# Patient Record
Sex: MALE | Race: WHITE | NOT HISPANIC OR LATINO | URBAN - METROPOLITAN AREA
[De-identification: names, ages, dates, MRNs, and addresses within clinical notes are randomized per-mention and may not be internally consistent; named-entity substitution may affect disease eponyms.]

---

## 2024-01-12 ENCOUNTER — HOSPITAL ENCOUNTER (EMERGENCY)
Facility: HOSPITAL | Age: 29
Discharge: HOME | End: 2024-01-12
Payer: MEDICAID

## 2024-01-12 ENCOUNTER — APPOINTMENT (OUTPATIENT)
Dept: RADIOLOGY | Facility: HOSPITAL | Age: 29
End: 2024-01-12
Payer: MEDICAID

## 2024-01-12 ENCOUNTER — APPOINTMENT (OUTPATIENT)
Dept: CARDIOLOGY | Facility: HOSPITAL | Age: 29
End: 2024-01-12
Payer: MEDICAID

## 2024-01-12 VITALS
DIASTOLIC BLOOD PRESSURE: 102 MMHG | TEMPERATURE: 98.4 F | HEIGHT: 71 IN | SYSTOLIC BLOOD PRESSURE: 146 MMHG | WEIGHT: 175 LBS | HEART RATE: 90 BPM | OXYGEN SATURATION: 95 % | BODY MASS INDEX: 24.5 KG/M2 | RESPIRATION RATE: 18 BRPM

## 2024-01-12 DIAGNOSIS — R11.2 NAUSEA AND VOMITING, UNSPECIFIED VOMITING TYPE: Primary | ICD-10-CM

## 2024-01-12 LAB
ALBUMIN SERPL BCP-MCNC: 4.5 G/DL (ref 3.4–5)
ALP SERPL-CCNC: 143 U/L (ref 33–120)
ALT SERPL W P-5'-P-CCNC: 22 U/L (ref 10–52)
ANION GAP SERPL CALC-SCNC: 15 MMOL/L (ref 10–20)
APTT PPP: 35 SECONDS (ref 27–38)
AST SERPL W P-5'-P-CCNC: 22 U/L (ref 9–39)
BASOPHILS # BLD AUTO: 0.03 X10*3/UL (ref 0–0.1)
BASOPHILS NFR BLD AUTO: 0.3 %
BILIRUB SERPL-MCNC: 0.4 MG/DL (ref 0–1.2)
BUN SERPL-MCNC: 10 MG/DL (ref 6–23)
CALCIUM SERPL-MCNC: 9.2 MG/DL (ref 8.6–10.3)
CHLORIDE SERPL-SCNC: 105 MMOL/L (ref 98–107)
CO2 SERPL-SCNC: 23 MMOL/L (ref 21–32)
CREAT SERPL-MCNC: 0.73 MG/DL (ref 0.5–1.3)
EGFRCR SERPLBLD CKD-EPI 2021: >90 ML/MIN/1.73M*2
EOSINOPHIL # BLD AUTO: 0.04 X10*3/UL (ref 0–0.7)
EOSINOPHIL NFR BLD AUTO: 0.4 %
ERYTHROCYTE [DISTWIDTH] IN BLOOD BY AUTOMATED COUNT: 13.2 % (ref 11.5–14.5)
GLUCOSE SERPL-MCNC: 106 MG/DL (ref 74–99)
HCT VFR BLD AUTO: 46.7 % (ref 41–52)
HGB BLD-MCNC: 16.4 G/DL (ref 13.5–17.5)
IMM GRANULOCYTES # BLD AUTO: 0.04 X10*3/UL (ref 0–0.7)
IMM GRANULOCYTES NFR BLD AUTO: 0.4 % (ref 0–0.9)
INR PPP: 1.1 (ref 0.9–1.1)
LACTATE SERPL-SCNC: 0.8 MMOL/L (ref 0.4–2)
LIPASE SERPL-CCNC: 42 U/L (ref 9–82)
LYMPHOCYTES # BLD AUTO: 1.02 X10*3/UL (ref 1.2–4.8)
LYMPHOCYTES NFR BLD AUTO: 9.4 %
MCH RBC QN AUTO: 30.6 PG (ref 26–34)
MCHC RBC AUTO-ENTMCNC: 35.1 G/DL (ref 32–36)
MCV RBC AUTO: 87 FL (ref 80–100)
MONOCYTES # BLD AUTO: 1.01 X10*3/UL (ref 0.1–1)
MONOCYTES NFR BLD AUTO: 9.3 %
NEUTROPHILS # BLD AUTO: 8.74 X10*3/UL (ref 1.2–7.7)
NEUTROPHILS NFR BLD AUTO: 80.2 %
NRBC BLD-RTO: 0 /100 WBCS (ref 0–0)
PLATELET # BLD AUTO: 327 X10*3/UL (ref 150–450)
POTASSIUM SERPL-SCNC: 3.9 MMOL/L (ref 3.5–5.3)
PROT SERPL-MCNC: 7.4 G/DL (ref 6.4–8.2)
PROTHROMBIN TIME: 12.1 SECONDS (ref 9.8–12.8)
RBC # BLD AUTO: 5.36 X10*6/UL (ref 4.5–5.9)
S PYO DNA THROAT QL NAA+PROBE: NOT DETECTED
SODIUM SERPL-SCNC: 139 MMOL/L (ref 136–145)
WBC # BLD AUTO: 10.9 X10*3/UL (ref 4.4–11.3)

## 2024-01-12 PROCEDURE — 87651 STREP A DNA AMP PROBE: CPT | Performed by: PHYSICIAN ASSISTANT

## 2024-01-12 PROCEDURE — 96374 THER/PROPH/DIAG INJ IV PUSH: CPT

## 2024-01-12 PROCEDURE — 36415 COLL VENOUS BLD VENIPUNCTURE: CPT | Performed by: PHYSICIAN ASSISTANT

## 2024-01-12 PROCEDURE — 99284 EMERGENCY DEPT VISIT MOD MDM: CPT

## 2024-01-12 PROCEDURE — 83605 ASSAY OF LACTIC ACID: CPT | Performed by: PHYSICIAN ASSISTANT

## 2024-01-12 PROCEDURE — 2500000004 HC RX 250 GENERAL PHARMACY W/ HCPCS (ALT 636 FOR OP/ED): Performed by: PHYSICIAN ASSISTANT

## 2024-01-12 PROCEDURE — 85025 COMPLETE CBC W/AUTO DIFF WBC: CPT | Performed by: PHYSICIAN ASSISTANT

## 2024-01-12 PROCEDURE — 2500000001 HC RX 250 WO HCPCS SELF ADMINISTERED DRUGS (ALT 637 FOR MEDICARE OP): Performed by: PHYSICIAN ASSISTANT

## 2024-01-12 PROCEDURE — 93005 ELECTROCARDIOGRAM TRACING: CPT

## 2024-01-12 PROCEDURE — 71046 X-RAY EXAM CHEST 2 VIEWS: CPT | Performed by: RADIOLOGY

## 2024-01-12 PROCEDURE — C9113 INJ PANTOPRAZOLE SODIUM, VIA: HCPCS | Performed by: PHYSICIAN ASSISTANT

## 2024-01-12 PROCEDURE — 71046 X-RAY EXAM CHEST 2 VIEWS: CPT

## 2024-01-12 PROCEDURE — 85610 PROTHROMBIN TIME: CPT | Performed by: PHYSICIAN ASSISTANT

## 2024-01-12 PROCEDURE — 84520 ASSAY OF UREA NITROGEN: CPT | Performed by: PHYSICIAN ASSISTANT

## 2024-01-12 PROCEDURE — 83690 ASSAY OF LIPASE: CPT | Performed by: PHYSICIAN ASSISTANT

## 2024-01-12 PROCEDURE — 85730 THROMBOPLASTIN TIME PARTIAL: CPT | Performed by: PHYSICIAN ASSISTANT

## 2024-01-12 RX ORDER — PANTOPRAZOLE SODIUM 40 MG/10ML
40 INJECTION, POWDER, LYOPHILIZED, FOR SOLUTION INTRAVENOUS ONCE
Status: COMPLETED | OUTPATIENT
Start: 2024-01-12 | End: 2024-01-12

## 2024-01-12 RX ORDER — FAMOTIDINE 40 MG/1
40 TABLET, FILM COATED ORAL NIGHTLY PRN
Qty: 20 TABLET | Refills: 0 | Status: SHIPPED | OUTPATIENT
Start: 2024-01-12 | End: 2024-02-01

## 2024-01-12 RX ORDER — PHENOL/SODIUM PHENOLATE
20 AEROSOL, SPRAY (ML) MUCOUS MEMBRANE DAILY
Qty: 30 TABLET | Refills: 0 | Status: SHIPPED | OUTPATIENT
Start: 2024-01-12

## 2024-01-12 RX ORDER — SUCRALFATE 1 G/10ML
1 SUSPENSION ORAL ONCE
Status: DISCONTINUED | OUTPATIENT
Start: 2024-01-12 | End: 2024-01-13 | Stop reason: HOSPADM

## 2024-01-12 RX ORDER — FAMOTIDINE 20 MG/1
20 TABLET, FILM COATED ORAL ONCE
Status: COMPLETED | OUTPATIENT
Start: 2024-01-12 | End: 2024-01-12

## 2024-01-12 RX ORDER — SUCRALFATE 1 G/10ML
1 SUSPENSION ORAL 4 TIMES DAILY
Qty: 1200 ML | Refills: 0 | Status: SHIPPED | OUTPATIENT
Start: 2024-01-12 | End: 2024-02-11

## 2024-01-12 RX ADMIN — PANTOPRAZOLE SODIUM 40 MG: 40 INJECTION, POWDER, FOR SOLUTION INTRAVENOUS at 22:37

## 2024-01-12 RX ADMIN — FAMOTIDINE 20 MG: 20 TABLET, FILM COATED ORAL at 22:37

## 2024-01-12 ASSESSMENT — PAIN - FUNCTIONAL ASSESSMENT: PAIN_FUNCTIONAL_ASSESSMENT: 0-10

## 2024-01-12 ASSESSMENT — PAIN SCALES - GENERAL
PAINLEVEL_OUTOF10: 7
PAINLEVEL_OUTOF10: 3

## 2024-01-12 ASSESSMENT — PAIN DESCRIPTION - PAIN TYPE: TYPE: ACUTE PAIN

## 2024-01-12 ASSESSMENT — PAIN DESCRIPTION - DESCRIPTORS: DESCRIPTORS: BURNING

## 2024-01-12 ASSESSMENT — PAIN DESCRIPTION - LOCATION: LOCATION: ABDOMEN

## 2024-01-13 NOTE — ED TRIAGE NOTES
Patient arrives reporting nausea with blood streaked emesis and bloody stool with lower abdominal pain. No anticoagulants. No hx of ulcerative colitis/crohns. He additionally endorses chest pain. Recently tested positive for strep and COVID last week. Not presently prescribed antibiotics, but has taken leftover Zithromax script.

## 2024-01-13 NOTE — ED TRIAGE NOTES
"The patient was seen and examined in triage.    History of Present Illness: The patient is a 28-year-old male who presents emergency department with his girlfriend and mother for assessment of multiple complaints.  He reports for several weeks he has been vomiting blood in which she describes as \"bright red \"however his mom reports it is coffee-ground.  He reports that he is having epigastric abdominal discomfort as well.  He has been having diarrhea which is also bright red in color.  He reports that he cannot keep anything down.  He reports that today he was having a lot of burning chest pains.  Last week he had COVID and for the last 3 days has been complaining of sore throat.  He reports that his \"girl works here and hooked him up and gave him a strep test that was positive \".  He mother started him on leftover azithromycin.  He reports that he \"used to drink daily but has not done that in a while \".  Denies daily NSAID usage.  Denies history of GI bleed in the past.  Denies history of Crohn's or ulcerative colitis.    Brief Physical Exam:  Exam is limited by the patient sitting in a chair in triage.   Heart: Regular rate and rhythm.   Lungs: Clear to auscultation bilaterally.   Abdomen: Soft, nondistended, normoactive bowel sounds.  Epigastric tenderness.    Plan: Appropriate labs and diagnostic imaging were ordered.      For the remainder of the patient's workup and ED course, please refer to the main ED provider note. We discussed need for diagnostic testing including laboratory studies and imaging.  We also discussed that they may be asked to wait in the waiting room while these tests are pending.  They understand that if they choose to leave without having the testing completed or resulted that we cannot rule out acute life threatening illnesses and the risks involved could lead to worsening condition, permanent disability or even death.      Disclaimer: This note was dictated by speech recognition. Minor " errors in transcription may be present. Please call if questions.

## 2024-01-13 NOTE — ED PROVIDER NOTES
HPI   Chief Complaint   Patient presents with    Vomiting Blood     Patient arrives reporting nausea with blood streaked emesis and bloody stool with lower abdominal pain. No anticoagulants. No hx of ulcerative colitis/crohns. He additionally endorses chest pain. Recently tested positive for strep and COVID last week. Not presently prescribed antibiotics, but has taken leftover Zithromax script.        28-year-old male presents today complaining of nausea and vomiting.  He states have been intermittent for the past few weeks.  Reports concern because he believes there may have been blood in his emesis.  Denies any passage of clots.  He states recent diagnosis of COVID-19 and strep pharyngitis.  He reports additional symptoms consisting of a nonproductive cough.  The patient is not anticoagulated.  He does admit to consuming alcohol.  He reports that he was seen and evaluated most recently at the [facility] for similar symptoms however he was discharged home.  He states that he was given GI follow-up however he missed his appointment.                          No data recorded                Patient History   No past medical history on file.  No past surgical history on file.  No family history on file.  Social History     Tobacco Use    Smoking status: Not on file    Smokeless tobacco: Not on file   Substance Use Topics    Alcohol use: Not on file    Drug use: Not on file       Physical Exam   ED Triage Vitals   Temp Heart Rate Resp BP   01/12/24 1916 01/12/24 1916 01/12/24 1916 01/12/24 1916   36.8 °C (98.2 °F) 100 17 (!) 152/113      SpO2 Temp src Heart Rate Source Patient Position   01/12/24 1916 -- 01/12/24 2240 01/12/24 2240   95 %  Monitor Sitting      BP Location FiO2 (%)     01/12/24 2240 --     Left arm        Physical Exam  Vitals and nursing note reviewed.   Constitutional:       General: He is not in acute distress.     Appearance: Normal appearance. He is normal weight. He is not ill-appearing,  toxic-appearing or diaphoretic.   HENT:      Head: Normocephalic.      Nose: Nose normal.      Mouth/Throat:      Mouth: Mucous membranes are moist.   Eyes:      Extraocular Movements: Extraocular movements intact.      Conjunctiva/sclera: Conjunctivae normal.   Cardiovascular:      Rate and Rhythm: Normal rate and regular rhythm.      Pulses: Normal pulses.   Pulmonary:      Effort: Pulmonary effort is normal. No respiratory distress.      Breath sounds: Normal breath sounds.   Abdominal:      General: Abdomen is flat. Bowel sounds are normal. There is no distension.      Palpations: Abdomen is soft.      Tenderness: There is no abdominal tenderness. There is no guarding or rebound.   Musculoskeletal:         General: Normal range of motion.      Cervical back: Normal range of motion and neck supple.   Skin:     General: Skin is warm and dry.      Capillary Refill: Capillary refill takes less than 2 seconds.   Neurological:      General: No focal deficit present.      Mental Status: He is alert and oriented to person, place, and time.   Psychiatric:         Mood and Affect: Mood normal.         Behavior: Behavior normal.         Thought Content: Thought content normal.         Judgment: Judgment normal.         ED Course & MDM   Diagnoses as of 01/12/24 2249   Nausea and vomiting, unspecified vomiting type       Medical Decision Making  Patient's workup was unremarkable.  He is hemodynamically stable.  Hemoglobin stable.  Patient is not anticoagulated.  Patient had no vomiting during this encounter.  Patient's abdomen is soft and nontender does not require imaging.  Discussed differentials at length.  Recommend that he discontinue his alcohol use.  Encouraged patient to follow-up closely with gastroenterology.  He was treated with Pepcid and Protonix and Carafate.  Will be discharged home with similar medication.  Return precautions were discussed at length.        Procedure  Procedures     Ross Andrews,  MAGGIE  01/12/24 7143

## 2024-01-15 LAB
ATRIAL RATE: 93 BPM
P AXIS: 71 DEGREES
PR INTERVAL: 119 MS
Q ONSET: 253 MS
QRS COUNT: 15 BEATS
QRS DURATION: 91 MS
QT INTERVAL: 342 MS
QTC CALCULATION(BAZETT): 428 MS
QTC FREDERICIA: 397 MS
R AXIS: 67 DEGREES
T AXIS: 50 DEGREES
T OFFSET: 424 MS
VENTRICULAR RATE: 94 BPM